# Patient Record
Sex: FEMALE
[De-identification: names, ages, dates, MRNs, and addresses within clinical notes are randomized per-mention and may not be internally consistent; named-entity substitution may affect disease eponyms.]

---

## 2024-07-22 ENCOUNTER — NON-APPOINTMENT (OUTPATIENT)
Age: 59
End: 2024-07-22

## 2024-07-23 ENCOUNTER — APPOINTMENT (OUTPATIENT)
Age: 59
End: 2024-07-23

## 2024-07-23 VITALS
HEART RATE: 76 BPM | DIASTOLIC BLOOD PRESSURE: 83 MMHG | OXYGEN SATURATION: 97 % | WEIGHT: 227 LBS | SYSTOLIC BLOOD PRESSURE: 122 MMHG | HEIGHT: 66 IN | BODY MASS INDEX: 36.48 KG/M2

## 2024-07-23 DIAGNOSIS — M75.02 ADHESIVE CAPSULITIS OF LEFT SHOULDER: ICD-10-CM

## 2024-07-23 PROBLEM — Z00.00 ENCOUNTER FOR PREVENTIVE HEALTH EXAMINATION: Status: ACTIVE | Noted: 2024-07-23

## 2024-07-23 PROCEDURE — 99204 OFFICE O/P NEW MOD 45 MIN: CPT | Mod: 25

## 2024-07-23 PROCEDURE — 76942 ECHO GUIDE FOR BIOPSY: CPT

## 2024-07-23 PROCEDURE — 20611 DRAIN/INJ JOINT/BURSA W/US: CPT | Mod: LT,59

## 2024-07-23 PROCEDURE — 64418 NJX AA&/STRD SPRSCAP NRV: CPT | Mod: LT,59

## 2024-07-23 NOTE — ASSESSMENT
[FreeTextEntry1] : BEKA is a 59 year  RIGHT hand dominant F who presents for initial visit with left shoulder pain.  Presentation consistent with frozen shoulder.  US guided suprascapular nerve block and GH CSI performed at today's visit  Plan 1.  Prescription for PT provided 2. Recommend over the counter NSAIDs/Tylenol PRN 3. RTC in 3 months

## 2024-07-23 NOTE — PROCEDURE
[de-identified] : Ultrasound Guided Injection   Indication: Ensure placement within the glenohumeral joint   utilizing the Sonosite portable ultrasound machine, the curvilinear 25mm 5-1 MHz transducer, sterile ultrasound gel, ultrasound guidance with the probe in long axis to the joint, utilizing an in-plane approach, was used for the following injection:    Injection: LEFT glenohumeral joint .  Indication: Adhesive capsulitis A discussion was had with the patient regarding this procedure and all questions were answered. All risks, benefits and alternatives were discussed. These included but were not limited to bleeding, infection, and allergic reaction. A timeout was performed prior to the procedure to ensure proper side.  Chlorhexidine was used to sterilize the skin overlying the glenohumeral joint. A 22-gauge 3.5 inch spinal needle was used to inject 2cc of 0.5% Ropivacaine, 2cc 1% Lidocaine, 1cc of 40mg/mL Depo-Medrol into the joint from an anterior medial approach. A sterile bandage was then applied. The patient tolerated the procedure well and there were no complications.  Ultrasound Guided Injection   Indication: Ensure placement within the subacromial bursa    Utilizing the Sonosite portable ultrasound machine, the Linear 25mm 15-4 MHz transducer, sterile ultrasound gel, ultrasound guidance with the probe in long axis to the spine of the scapula at the suprascapular notch, utilizing an in-plane approach, was used for the following injection:    Injection: LEFT suprascapular nerve block.  Indication: Acute on chronic rotator cuff tendinopathy with bursitis.    A discussion was had with the patient regarding this procedure and all questions were answered. All risks, benefits and alternatives were discussed. These included but were not limited to bleeding, infection, and allergic reaction. A timeout was performed prior to the procedure to ensure proper side.  Chlorhexidine was used to sterilize the skin overlying the suprascapular notch.  A 21-gauge needle was used to inject 1cc of 0.5% Ropivacaine, 1cc 1% Lidocaine, 1cc of 4mg/mL Dexamethasone into the bursa from an anterolateral approach. A sterile bandage was then applied. The patient tolerated the procedure well and there were no complications.

## 2024-07-23 NOTE — DISCUSSION/SUMMARY
[de-identified] : BEKA STOVER is a 59 year old right-hand-dominant female with well-controlled HTN presenting with 3-month history of left-sided shoulder pain and restricted range of motion after a fall consistent with adhesive capsulitis.  Plan: 1.  Left suprascapular nerve block and glenohumeral joint injection performed today as above with significant postprocedural relief and moderate improvement in range of motion 2.  Referral given to physical therapy 3.  Given patient's adverse reaction to prescription meloxicam discussed OTC Tylenol/NSAIDs as tolerated with food 4.  Patient will follow-up in 3 months

## 2024-07-23 NOTE — REASON FOR VISIT
[Initial Visit] : an initial visit for [Shoulder Pain] : shoulder pain [[employer] ] : provided by [employer]   [Interpreters_FullName] : Kelin [[email or web address]] : Ivorian

## 2024-07-23 NOTE — HISTORY OF PRESENT ILLNESS
[de-identified] :  BEKA is a 59 year  RIGHT hand dominant F who presents for initial visit with left shoulder pain.  Pain is primarily located over the lateral aspect.   It began 3/2024 after a fall onto the shoulder.  Pain has gotten progressively worse over the months.   She is unable to overhead activities.  Pain is described as throbbing in nature.  After the fall she saw her primary care physician and had XRs with no fracture per patient.  She returned to the PCP 1 week ago and tried Meloxicam which she felt made the pain worse.  She denies cervical pain, numbness and tingling

## 2024-07-23 NOTE — PHYSICAL EXAM
[de-identified] :   Shoulder (LEFT)  Inspection Skin: normal Scapular winging: none  Palpation Tenderness: none  ROM Flexion- restricted with both passive and active to 90 degrees Abduction - restricted with both passive and active to 90 degrees Rotation (internal) - restricted with both passive and active to the sacrum Rotation (external) - restricted with both passive and active by 5-10 degrees   Motor Strength Flexion- 5/5 Abduction - 5/5 Rotation (internal) - 5/5 Rotation (external) - 5/5  Stability- normal Sensory index- normal  Special Tests Impingement-Denise and Neer's positive Empty Can/Painful Arc-positive Speed test- normal [de-identified] : XR of left shoulder Date: 7/23/2024   Views: 3 views Performed at [facility]: Yes Impression: No fracture no dislocation good alignment.  These images were personally reviewed with original findings documented as above.

## 2024-10-24 ENCOUNTER — APPOINTMENT (OUTPATIENT)
Age: 59
End: 2024-10-24
Payer: COMMERCIAL

## 2024-10-24 VITALS
DIASTOLIC BLOOD PRESSURE: 85 MMHG | OXYGEN SATURATION: 97 % | WEIGHT: 22 LBS | BODY MASS INDEX: 3.54 KG/M2 | HEIGHT: 66 IN | HEART RATE: 68 BPM | SYSTOLIC BLOOD PRESSURE: 124 MMHG

## 2024-10-24 DIAGNOSIS — M75.02 ADHESIVE CAPSULITIS OF LEFT SHOULDER: ICD-10-CM

## 2024-10-24 PROCEDURE — 99213 OFFICE O/P EST LOW 20 MIN: CPT

## 2025-01-28 ENCOUNTER — APPOINTMENT (OUTPATIENT)
Age: 60
End: 2025-01-28
Payer: MEDICAID

## 2025-01-28 VITALS
DIASTOLIC BLOOD PRESSURE: 84 MMHG | HEIGHT: 66 IN | BODY MASS INDEX: 35.36 KG/M2 | SYSTOLIC BLOOD PRESSURE: 131 MMHG | HEART RATE: 65 BPM | WEIGHT: 220 LBS | OXYGEN SATURATION: 96 %

## 2025-01-28 DIAGNOSIS — M75.02 ADHESIVE CAPSULITIS OF LEFT SHOULDER: ICD-10-CM

## 2025-01-28 PROCEDURE — 99213 OFFICE O/P EST LOW 20 MIN: CPT

## 2025-05-29 ENCOUNTER — APPOINTMENT (OUTPATIENT)
Age: 60
End: 2025-05-29
Payer: MEDICAID

## 2025-05-29 DIAGNOSIS — M75.02 ADHESIVE CAPSULITIS OF LEFT SHOULDER: ICD-10-CM

## 2025-05-29 PROCEDURE — 99213 OFFICE O/P EST LOW 20 MIN: CPT

## 2025-05-29 RX ORDER — DICLOFENAC SODIUM 75 MG/1
75 TABLET, DELAYED RELEASE ORAL
Qty: 28 | Refills: 2 | Status: ACTIVE | COMMUNITY
Start: 2025-05-29 | End: 1900-01-01

## 2025-06-24 ENCOUNTER — RX RENEWAL (OUTPATIENT)
Age: 60
End: 2025-06-24

## 2025-09-02 ENCOUNTER — APPOINTMENT (OUTPATIENT)
Age: 60
End: 2025-09-02
Payer: MEDICAID

## 2025-09-02 DIAGNOSIS — M75.02 ADHESIVE CAPSULITIS OF LEFT SHOULDER: ICD-10-CM

## 2025-09-02 PROCEDURE — 99213 OFFICE O/P EST LOW 20 MIN: CPT
